# Patient Record
Sex: MALE | Race: BLACK OR AFRICAN AMERICAN | NOT HISPANIC OR LATINO | Employment: FULL TIME | URBAN - METROPOLITAN AREA
[De-identification: names, ages, dates, MRNs, and addresses within clinical notes are randomized per-mention and may not be internally consistent; named-entity substitution may affect disease eponyms.]

---

## 2017-09-12 ENCOUNTER — HOSPITAL ENCOUNTER (EMERGENCY)
Facility: HOSPITAL | Age: 27
Discharge: HOME | End: 2017-09-12
Attending: EMERGENCY MEDICINE
Payer: COMMERCIAL

## 2017-09-12 VITALS
WEIGHT: 196 LBS | HEIGHT: 73 IN | DIASTOLIC BLOOD PRESSURE: 79 MMHG | HEART RATE: 66 BPM | TEMPERATURE: 99 F | RESPIRATION RATE: 18 BRPM | BODY MASS INDEX: 25.98 KG/M2 | OXYGEN SATURATION: 99 % | SYSTOLIC BLOOD PRESSURE: 116 MMHG

## 2017-09-12 DIAGNOSIS — R06.02 SOB (SHORTNESS OF BREATH): ICD-10-CM

## 2017-09-12 DIAGNOSIS — R20.2 PARESTHESIA: Primary | ICD-10-CM

## 2017-09-12 DIAGNOSIS — R82.90 ABNORMAL URINALYSIS: ICD-10-CM

## 2017-09-12 DIAGNOSIS — R07.89 CHEST TIGHTNESS: ICD-10-CM

## 2017-09-12 LAB
BILIRUB UR QL STRIP: NEGATIVE
CLARITY UR: CLEAR
COLOR UR: YELLOW
GLUCOSE UR QL STRIP: NEGATIVE
HGB UR QL STRIP: NEGATIVE
KETONES UR QL STRIP: ABNORMAL
LEUKOCYTE ESTERASE UR QL STRIP: NEGATIVE
NITRITE UR QL STRIP: NEGATIVE
PH UR STRIP: 5 [PH] (ref 5–8)
PROT UR QL STRIP: NEGATIVE
SP GR UR STRIP: 1.02 (ref 1–1.03)
URN SPEC COLLECT METH UR: ABNORMAL
UROBILINOGEN UR STRIP-ACNC: NEGATIVE EU/DL

## 2017-09-12 PROCEDURE — 99284 EMERGENCY DEPT VISIT MOD MDM: CPT

## 2017-09-12 PROCEDURE — 93010 ELECTROCARDIOGRAM REPORT: CPT | Mod: ,,, | Performed by: INTERNAL MEDICINE

## 2017-09-12 PROCEDURE — 81003 URINALYSIS AUTO W/O SCOPE: CPT

## 2017-09-12 PROCEDURE — 93005 ELECTROCARDIOGRAM TRACING: CPT

## 2017-09-12 NOTE — ED PROVIDER NOTES
"Encounter Date: 9/12/2017       History     Chief Complaint   Patient presents with    Abnormal ECG     "I went to urgent care for back pain but they wanted me to come for an abdnormal EKG."     Chief complaint: Abnormal EKG    History of present illness: Patient is a 27-year-old male who presents for emergent consideration of an abnormal EKG.  He was seen at the [facility] urgent care for lower back pain that he states is intermittent and sharp tingling.  States that he's use over-the-counter BC powders and Tylenol with no relief of the back pain nothing aggravates the problem it radiates to the upper back.  Time of day is not important to the back pain current severity of back pain is 7/10.  An EKG was performed because of positive review of system for shortness of breath and chest tightness as well as lightheadedness.  He reports that the tightness is increased with deep breathing.  He denies urinary symptoms such as frequency, urgency, dysuria, hematuria ([facility] Documentation available in media tab).      HPI  Review of patient's allergies indicates:  No Known Allergies  History reviewed. No pertinent past medical history.  History reviewed. No pertinent surgical history.  Family History   Problem Relation Age of Onset    Diabetes Mother     Diabetes Maternal Grandmother      Social History   Substance Use Topics    Smoking status: Current Every Day Smoker     Types: Cigarettes    Smokeless tobacco: Never Used    Alcohol use Yes      Comment: weekend     Review of Systems   Constitutional: Negative for appetite change, chills, diaphoresis, fatigue and fever.   HENT: Negative for congestion, ear discharge, ear pain, postnasal drip, rhinorrhea, sinus pressure, sneezing, sore throat and voice change.    Eyes: Negative for discharge, itching and visual disturbance.   Respiratory: Positive for chest tightness and shortness of breath. Negative for cough and wheezing.    Cardiovascular: Negative for " chest pain, palpitations and leg swelling.   Gastrointestinal: Negative for abdominal pain, nausea and vomiting.   Endocrine: Negative for polydipsia, polyphagia and polyuria.   Genitourinary: Negative for difficulty urinating, discharge, dysuria, frequency, hematuria, penile pain, penile swelling and urgency.   Musculoskeletal: Negative for arthralgias and myalgias.   Skin: Negative for rash and wound.   Neurological: Negative for dizziness, seizures, syncope and weakness.   Hematological: Negative for adenopathy. Does not bruise/bleed easily.   Psychiatric/Behavioral: Negative for agitation and self-injury. The patient is not nervous/anxious.        Physical Exam     Initial Vitals [09/12/17 1649]   BP Pulse Resp Temp SpO2   126/76 72 18 98.6 °F (37 °C) 99 %      MAP       92.67         Physical Exam    Nursing note and vitals reviewed.  Constitutional: He appears well-developed and well-nourished. He is not diaphoretic. No distress. He is not intubated.   HENT:   Head: Normocephalic and atraumatic.   Right Ear: External ear normal.   Left Ear: External ear normal.   Nose: Nose normal.   Eyes: Pupils are equal, round, and reactive to light. Right eye exhibits no discharge. Left eye exhibits no discharge. No scleral icterus.   Neck: Normal range of motion.   Cardiovascular: Regular rhythm, S1 normal, S2 normal and normal heart sounds. Exam reveals no gallop.    No murmur heard.  Pulmonary/Chest: Effort normal and breath sounds normal. No accessory muscle usage. No apnea, no tachypnea and no bradypnea. He is not intubated. No respiratory distress. He has no decreased breath sounds. He has no wheezes. He has no rhonchi. He has no rales.   Abdominal: He exhibits no distension.   Musculoskeletal: Normal range of motion.   Spine is atraumatic with no step-offs.   Neurological: He is alert and oriented to person, place, and time. He has normal strength. No cranial nerve deficit or sensory deficit. He exhibits normal  "muscle tone. He displays a negative Romberg sign. Coordination and gait normal. GCS eye subscore is 4. GCS verbal subscore is 5. GCS motor subscore is 6.   Equal  strength bilaterally, equal bicep flexion and tricep extension strength, leg extension and flexion strength appropriate and equal, foot plantar- and dorsi-flexion equal and appropriate   Skin: Skin is dry. Capillary refill takes less than 2 seconds.         ED Course   Procedures  Labs Reviewed   URINALYSIS     EKG Readings: (Independently Interpreted)   Initial Reading: No STEMI. Rhythm: Normal Sinus Rhythm. Heart Rate: 68. Ectopy: No Ectopy.   Early repolarization present.          Medical Decision Making:   Initial Assessment:   27 y.o. male who presents for emergent consideration of Patient presents with:  Abnormal ECG: "I went to urgent care for back pain but they wanted me to come for an abdnormal EKG."  .    ED Management:  Results Review: In the emergency department a chest x-ray and urinalysis was ordered as well as an EKG.  EKG interpretation as above, heart rate of 68, no ectopy, normal sinus rhythm, early repolarization noted.  Urinalysis was normal with the exception of positive ketones.  Patient declined chest x-ray and signed an AMA form.    Differential diagnosis: abnormal ekg, abnormal ua, mi, acs, pneumonia, pleural effusion, parethesias of the spine and back, abnormal labs, pulmonary embolism. While EKG does show early repolarization, there is no baseline ST elevation or depression, t waves keep the correct polarity.  Acute MI or ACS is not likely in this male with no other risk factors.  (not obese, nonsmoker, no family history of early cardiac death or disease).  Pneumonia is unlikely as the patient has no fever or chills and SpO2 =99%.  Lack of tachycardia and decreased pulse ox also makes PE less likely.      Medical Diagnosis: The primary encounter diagnosis was Paresthesia. Diagnoses of SOB (shortness of breath), Chest " tightness, and Abnormal urinalysis were also pertinent to this visit.    ED Course: No medications given in the ED.    Discharge Instructions: On discharge the following medications were orered: none (pt states he would prefer to return to the [facility] ED as they offered tylenol #3, a steroid injection, and pain meds for daytime; therapy I offered for paresthesia included anti-inflammatories and a muscle relaxer) and the patient was instructed to follow up with: Primary Care Provider.    Patient was discharged home with an instructional sheet which gave not only information regarding the most likely diagnoses but also information regarding when to return to the emergency department for alarming symptoms and when to seek further care.    Care of the patient discussed with Dr. Galindo who agreed with the assessment and plan.                     ED Course      Clinical Impression:   The primary encounter diagnosis was Paresthesia. Diagnoses of SOB (shortness of breath), Chest tightness, and Abnormal urinalysis were also pertinent to this visit.    Disposition:   Disposition: Discharged  Condition: Stable                        Ayaan Bocanegra, ARMANI  09/12/17 1908

## 2018-02-08 ENCOUNTER — CLINICAL SUPPORT (OUTPATIENT)
Dept: URGENT CARE | Facility: CLINIC | Age: 28
End: 2018-02-08

## 2018-02-08 DIAGNOSIS — Z23 ENCOUNTER FOR IMMUNIZATION: Primary | ICD-10-CM

## 2018-02-08 PROCEDURE — 90746 HEPB VACCINE 3 DOSE ADULT IM: CPT | Mod: S$GLB,,,

## 2018-03-13 ENCOUNTER — CLINICAL SUPPORT (OUTPATIENT)
Dept: URGENT CARE | Facility: CLINIC | Age: 28
End: 2018-03-13

## 2018-03-13 DIAGNOSIS — Z23 ENCOUNTER FOR IMMUNIZATION: Primary | ICD-10-CM

## 2018-03-13 PROCEDURE — 90746 HEPB VACCINE 3 DOSE ADULT IM: CPT | Mod: S$GLB,,,
